# Patient Record
Sex: MALE | Race: WHITE | Employment: OTHER | URBAN - METROPOLITAN AREA
[De-identification: names, ages, dates, MRNs, and addresses within clinical notes are randomized per-mention and may not be internally consistent; named-entity substitution may affect disease eponyms.]

---

## 2017-01-16 ENCOUNTER — ALLIED HEALTH/NURSE VISIT (OUTPATIENT)
Dept: FAMILY MEDICINE | Facility: CLINIC | Age: 52
End: 2017-01-16

## 2017-01-16 ENCOUNTER — TELEPHONE (OUTPATIENT)
Dept: FAMILY MEDICINE | Facility: CLINIC | Age: 52
End: 2017-01-16

## 2017-01-16 VITALS — DIASTOLIC BLOOD PRESSURE: 96 MMHG | RESPIRATION RATE: 18 BRPM | SYSTOLIC BLOOD PRESSURE: 147 MMHG | HEART RATE: 57 BPM

## 2017-01-16 DIAGNOSIS — Z01.30 BP CHECK: Primary | ICD-10-CM

## 2017-01-16 PROCEDURE — 99207 ZZC NO CHARGE NURSE ONLY: CPT

## 2017-01-16 NOTE — TELEPHONE ENCOUNTER
S-(situation): Patient in clinic today for bp check.  Taking chlorthalidone 25mg daily and metoprolol 100mg BID for bp control.  Denies s/s of high or low bp.  Patient states he has been watching what he eats.  He has lost some weight - uncertain of how much as he does not have a scale but notices his clothes fit better.  He is also very active with his daily activities.  He states his 2 bps today are much lower than what they have been.      B-(background): Blood pressures in clinic:  1st reading = 48/99, HR = 60  2nd reading = 147/96, HR = 57    BP Readings from Last 6 Encounters:   01/16/17 147/96   11/18/16 158/101   11/01/16 150/87   10/19/16 162/102   09/15/16 155/99   07/28/16 152/100     A-(assessment): Bp still above goal.    R-(recommendations): Please advise.    Lisa MITCHELL RN

## 2017-01-16 NOTE — TELEPHONE ENCOUNTER
Covering for PCP- Omar will likely need to start a third medication for his blood pressure.  Recommend that he make an appointment with Dr. Epstein to follow-up on this and discuss options.    Cecil Oliver MD

## 2017-02-08 DIAGNOSIS — I10 ESSENTIAL HYPERTENSION WITH GOAL BLOOD PRESSURE LESS THAN 140/90: Primary | ICD-10-CM

## 2017-02-08 NOTE — TELEPHONE ENCOUNTER
Chllorthalidone      Last Written Prescription Date: 12/27/2016  Last Fill Quantity: 30, # refills: 0  Last Office Visit with [facility] or [facility] prescribing provider: 11/01/2016       POTASSIUM   Date Value Ref Range Status   04/20/2016 3.6 3.4 - 5.3 mmol/L Final     CREATININE   Date Value Ref Range Status   04/20/2016 0.84 0.66 - 1.25 mg/dL Final     BP Readings from Last 3 Encounters:   01/16/17 147/96   11/18/16 158/101   11/01/16 150/87       Horacio HUTSON (R)

## 2017-02-14 RX ORDER — CHLORTHALIDONE 25 MG/1
25 TABLET ORAL DAILY
Qty: 30 TABLET | Refills: 0 | Status: SHIPPED | OUTPATIENT
Start: 2017-02-14 | End: 2017-07-10

## 2017-02-28 DIAGNOSIS — I10 ESSENTIAL HYPERTENSION WITH GOAL BLOOD PRESSURE LESS THAN 140/90: ICD-10-CM

## 2017-02-28 NOTE — TELEPHONE ENCOUNTER
Metoprolol      Last Written Prescription Date: 12/27/2016  Last Fill Quantity: 60, # refills: 0  Last Office Visit with [facility] or [facility] prescribing provider: 11/01/2016       Potassium   Date Value Ref Range Status   04/20/2016 3.6 3.4 - 5.3 mmol/L Final     Creatinine   Date Value Ref Range Status   04/20/2016 0.84 0.66 - 1.25 mg/dL Final     BP Readings from Last 3 Encounters:   01/16/17 (!) 147/96   11/18/16 (!) 158/101   11/01/16 150/87       Horacio HUTSON (R)

## 2017-03-01 RX ORDER — METOPROLOL TARTRATE 100 MG
100 TABLET ORAL 2 TIMES DAILY
Qty: 60 TABLET | Refills: 0 | Status: SHIPPED | OUTPATIENT
Start: 2017-03-01 | End: 2017-03-07

## 2017-03-01 NOTE — TELEPHONE ENCOUNTER
Per last BP check patient is to follow up with PCP to discuss adding another medication.  Patient notified of this and plans to follow up.  Refilled x1 month.    Marcie Torres RN

## 2017-03-07 ENCOUNTER — TELEPHONE (OUTPATIENT)
Dept: FAMILY MEDICINE | Facility: CLINIC | Age: 52
End: 2017-03-07

## 2017-03-07 ENCOUNTER — ALLIED HEALTH/NURSE VISIT (OUTPATIENT)
Dept: FAMILY MEDICINE | Facility: CLINIC | Age: 52
End: 2017-03-07

## 2017-03-07 VITALS — HEART RATE: 59 BPM | SYSTOLIC BLOOD PRESSURE: 134 MMHG | DIASTOLIC BLOOD PRESSURE: 85 MMHG

## 2017-03-07 DIAGNOSIS — I10 ESSENTIAL HYPERTENSION WITH GOAL BLOOD PRESSURE LESS THAN 140/90: Primary | ICD-10-CM

## 2017-03-07 DIAGNOSIS — I10 ESSENTIAL HYPERTENSION WITH GOAL BLOOD PRESSURE LESS THAN 140/90: ICD-10-CM

## 2017-03-07 PROCEDURE — 99207 ZZC NO CHARGE NURSE ONLY: CPT

## 2017-03-07 RX ORDER — METOPROLOL TARTRATE 100 MG
100 TABLET ORAL 2 TIMES DAILY
Qty: 180 TABLET | Refills: 0 | Status: SHIPPED | OUTPATIENT
Start: 2017-03-07 | End: 2017-07-01

## 2017-03-07 NOTE — TELEPHONE ENCOUNTER
Keep metoprolol as is.  Hold Chlorthalidone for now since BP is within goal.  Recheck Bp via RN visit in 2 months.

## 2017-03-07 NOTE — TELEPHONE ENCOUNTER
"Patient here for Bp recheck today, \"I have been exercising for 34 days in a row, including walking mostly. \"  \"once a week I may lift weights. \"    \"I am not taking the chlorthalidone. It was out when I went there a while ago and I just never went back to get it and I don't want to take it, \"  \"I am not opposed to taking it, but I don't think I need it, now. \" \"will you ask Dr if I can just skip that one, keep doing the exercise and stay on the metoprolol? \"     \"I am at max dose of the metoprolol, and that is where I am willing to stay until I can get my own BP down to the 120's or something, will you ask Dr Epstein if that is ok? \"    Creatinine   Date Value Ref Range Status   04/20/2016 0.84 0.66 - 1.25 mg/dL Final   ]  Potassium   Date Value Ref Range Status   04/20/2016 3.6 3.4 - 5.3 mmol/L Final   ]      Dr Epstein, BP today:   Vital Signs 3/7/2017   Systolic 134   Diastolic 85   Pulse 59   After exercising religiously for 34 days in a row, and taking metoprolol 100 mg bid     Ok? And refills? And when to see him again?     Salena Garrison RNC    "

## 2017-05-25 ENCOUNTER — APPOINTMENT (OUTPATIENT)
Dept: GENERAL RADIOLOGY | Facility: CLINIC | Age: 52
End: 2017-05-25
Attending: EMERGENCY MEDICINE
Payer: COMMERCIAL

## 2017-05-25 ENCOUNTER — HOSPITAL ENCOUNTER (EMERGENCY)
Facility: CLINIC | Age: 52
Discharge: HOME | End: 2017-05-25
Attending: EMERGENCY MEDICINE | Admitting: EMERGENCY MEDICINE
Payer: COMMERCIAL

## 2017-05-25 VITALS
HEART RATE: 76 BPM | OXYGEN SATURATION: 95 % | TEMPERATURE: 98.9 F | DIASTOLIC BLOOD PRESSURE: 119 MMHG | RESPIRATION RATE: 20 BRPM | SYSTOLIC BLOOD PRESSURE: 184 MMHG

## 2017-05-25 DIAGNOSIS — M54.50 ACUTE MIDLINE LOW BACK PAIN WITHOUT SCIATICA: ICD-10-CM

## 2017-05-25 DIAGNOSIS — S60.512A HAND ABRASION, LEFT, INITIAL ENCOUNTER: ICD-10-CM
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
## 2017-05-25 PROCEDURE — 99283 EMERGENCY DEPT VISIT LOW MDM: CPT

## 2017-05-25 PROCEDURE — 72100 X-RAY EXAM L-S SPINE 2/3 VWS: CPT

## 2017-05-25 PROCEDURE — 99284 EMERGENCY DEPT VISIT MOD MDM: CPT | Performed by: EMERGENCY MEDICINE

## 2017-05-25 ASSESSMENT — ENCOUNTER SYMPTOMS
BACK PAIN: 1
SHORTNESS OF BREATH: 0
CONFUSION: 0
COUGH: 0
NECK PAIN: 0
HEADACHES: 0
DIZZINESS: 0
VOMITING: 0
NUMBNESS: 0
LIGHT-HEADEDNESS: 0
NAUSEA: 0
ABDOMINAL PAIN: 0
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
## 2017-05-25 NOTE — ED AVS SNAPSHOT
[facility] Emergency Department    [address] [address]    Phone:  [phone]    Fax:  [phone]                                       Omar Taylor   MRN: [id number]    Department:  [facility] Emergency Department   Date of Visit:  5/25/2017           After Visit Summary Signature Page     I have received my discharge instructions, and my questions have been answered. I have discussed any challenges I see with this plan with the nurse or doctor.    ..........................................................................................................................................  Patient/Patient Representative Signature      ..........................................................................................................................................  Patient Representative Print Name and Relationship to Patient    ..................................................               ................................................  Date                                            Time    ..........................................................................................................................................  Reviewed by Signature/Title    ...................................................              ..............................................  Date                                                            Time
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
## 2017-06-27 ENCOUNTER — TELEPHONE (OUTPATIENT)
Dept: FAMILY MEDICINE | Facility: CLINIC | Age: 52
End: 2017-06-27

## 2017-06-27 DIAGNOSIS — I10 ESSENTIAL HYPERTENSION WITH GOAL BLOOD PRESSURE LESS THAN 140/90: ICD-10-CM

## 2017-06-27 RX ORDER — METOPROLOL TARTRATE 100 MG
TABLET ORAL
Qty: 180 TABLET | Refills: 0 | Status: CANCELLED | OUTPATIENT
Start: 2017-06-27

## 2017-06-27 NOTE — TELEPHONE ENCOUNTER
Metoprolol      Last Written Prescription Date: 03/07/2017  Last Fill Quantity: 180, # refills: 0    Last Office Visit with [facility] or [facility] prescribing provider:  11/01/2016   Future Office Visit:        BP Readings from Last 3 Encounters:   05/25/17 (!) 184/119   03/07/17 134/85   01/16/17 (!) 147/96

## 2017-07-01 ENCOUNTER — NURSE TRIAGE (OUTPATIENT)
Dept: NURSING | Facility: CLINIC | Age: 52
End: 2017-07-01

## 2017-07-01 DIAGNOSIS — I10 ESSENTIAL HYPERTENSION WITH GOAL BLOOD PRESSURE LESS THAN 140/90: ICD-10-CM

## 2017-07-01 RX ORDER — METOPROLOL TARTRATE 100 MG
100 TABLET ORAL 2 TIMES DAILY
Qty: 60 TABLET | Refills: 0 | OUTPATIENT
Start: 2017-07-01 | End: 2017-07-03
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
## 2017-07-03 ENCOUNTER — TELEPHONE (OUTPATIENT)
Dept: FAMILY MEDICINE | Facility: CLINIC | Age: 52
End: 2017-07-03

## 2017-07-03 DIAGNOSIS — I10 ESSENTIAL HYPERTENSION WITH GOAL BLOOD PRESSURE LESS THAN 140/90: ICD-10-CM

## 2017-07-03 RX ORDER — METOPROLOL TARTRATE 100 MG
100 TABLET ORAL 2 TIMES DAILY
Qty: 60 TABLET | Refills: 0 | Status: SHIPPED | OUTPATIENT
Start: 2017-07-03 | End: 2017-08-07

## 2017-07-03 NOTE — TELEPHONE ENCOUNTER
Resent the prescription to pharmacy again, script confirmed.  Left message on identifiable voicemail that script was resent today.  Kimani

## 2017-07-03 NOTE — TELEPHONE ENCOUNTER
Reason for Call:  Other prescription     Detailed comments: Pt is very upset - He is out of Metoprolol and he called and talked to a triage nurse Saturday and she was supposed to send an Rx to [facility].  Pt is at [facility] NOW and they do not have the Rx.  I see where the Rx was eprescribed 07/01/17, but I do not see that they confirmed receipt? Please send new Rx asap and call pt so that he knows they have rec'd it.        Phone Number Patient can be reached at: Cell number on file:    Telephone Information:   Mobile [phone]       Best Time: any    Can we leave a detailed message on this number? YES    Call taken on 7/3/2017 at 1:36 PM by Katrin Trent

## 2017-07-10 ENCOUNTER — TELEPHONE (OUTPATIENT)
Dept: FAMILY MEDICINE | Facility: CLINIC | Age: 52
End: 2017-07-10

## 2017-07-10 ENCOUNTER — ALLIED HEALTH/NURSE VISIT (OUTPATIENT)
Dept: FAMILY MEDICINE | Facility: CLINIC | Age: 52
End: 2017-07-10

## 2017-07-10 VITALS
WEIGHT: 262.2 LBS | SYSTOLIC BLOOD PRESSURE: 138 MMHG | DIASTOLIC BLOOD PRESSURE: 98 MMHG | HEART RATE: 60 BPM | BODY MASS INDEX: 37.89 KG/M2

## 2017-07-10 DIAGNOSIS — I10 HYPERTENSION GOAL BP (BLOOD PRESSURE) < 140/90: ICD-10-CM

## 2017-07-10 DIAGNOSIS — I10 ESSENTIAL HYPERTENSION WITH GOAL BLOOD PRESSURE LESS THAN 140/90: ICD-10-CM

## 2017-07-10 DIAGNOSIS — I10 ESSENTIAL HYPERTENSION WITH GOAL BLOOD PRESSURE LESS THAN 140/90: Primary | ICD-10-CM

## 2017-07-10 DIAGNOSIS — Z13.6 CARDIOVASCULAR SCREENING; LDL GOAL LESS THAN 130: Primary | ICD-10-CM

## 2017-07-10 PROCEDURE — 99207 ZZC NO CHARGE NURSE ONLY: CPT

## 2017-07-10 NOTE — TELEPHONE ENCOUNTER
Nursing Note   Zaida Mortensen RN (Registered Nurse)      Pt stopped into clinic for RN blood pressure recheck.  He was last seen 11/1/16 by provider.     Today's reading is 158/90, pulse of 56  Recheck 5 min later is 138/98, pulse of 60.     Pt began a weight loss program 12 weeks ago.  He is feeling great and losing weight.  Pt prefers to continue to work on his weight loss goals at this time and is reluctant to add medications; even temporarily.     Routed to provider for further instructions in a telephone note.     Zaida Mortensen RN        BP Readings from Last 6 Encounters:   07/10/17 (!) 138/98   05/25/17 (!) 184/119   03/07/17 134/85   01/16/17 (!) 147/96   11/18/16 (!) 158/101   11/01/16 150/87          Wt Readings from Last 2 Encounters:   07/10/17 262 lb 3.2 oz (118.9 kg)   11/01/16 287 lb 12.8 oz (130.5 kg)            Lab Results   Component Value Date     CR 0.84 04/20/2016            Lab Results   Component Value Date     POTASSIUM 3.6 04/20/2016          Recent Labs   Lab Test  03/26/15   1529   LDL  127         Also, pt is due for fasting lab recheck.  Zaida Mortensen RN

## 2017-07-10 NOTE — NURSING NOTE
BP Readings from Last 6 Encounters:   07/10/17 (!) 138/98   05/25/17 (!) 184/119   03/07/17 134/85   01/16/17 (!) 147/96   11/18/16 (!) 158/101   11/01/16 150/87     Wt Readings from Last 2 Encounters:   07/10/17 262 lb 3.2 oz (118.9 kg)   11/01/16 287 lb 12.8 oz (130.5 kg)     Lab Results   Component Value Date    CR 0.84 04/20/2016     Lab Results   Component Value Date    POTASSIUM 3.6 04/20/2016     Recent Labs   Lab Test  03/26/15   1529   LDL  127         Also, pt is due for lab recheck.

## 2017-07-10 NOTE — LETTER
[facility]  [address] [address]  [phone]        November 6, 2017    Omar Taylor  [address]  [place] [address]              Dear Omar Taylor    This is to remind you that your Basic profile and Fasting Lipids is due.    You may call our office at [phone] to schedule an appointment.    Please disregard this notice if you have already had your labs drawn or made an appointment.        Sincerely,        Thomas Epstein MD

## 2017-07-10 NOTE — NURSING NOTE
Pt stopped into clinic for RN blood pressure recheck.  He was last seen 11/1/16 by provider.    Today's reading is 158/90, pulse of 56  Recheck 5 min later is 138/98, pulse of 60.    Pt began a weight loss program 12 weeks ago.  He is feeling great and losing weight.  Pt prefers to continue to work on his weight loss goals at this time and is reluctant to add medications; even temporarily.    Routed to provider for further instructions in a telephone note.    Zaida Mortensen RN

## 2017-07-10 NOTE — TELEPHONE ENCOUNTER
BP not at goal.  Continue metoprolol 100 mg BID.  Restart Chlorthalidone 25 mg daily.  Recheck BP 1 month.    Continue sodium restriction and weight management.

## 2017-07-10 NOTE — MR AVS SNAPSHOT
"              After Visit Summary   7/10/2017    Omar Taylor    MRN: [id number]           Patient Information     Date Of Birth          1965        Visit Information        Provider Department      7/10/2017 1:00 PM CATHIE DENNIS/BOBBY PONCE [facility]        Today's Diagnoses     Essential hypertension with goal blood pressure less than 140/90    -  1       Follow-ups after your visit        Who to contact     If you have questions or need follow up information about today's clinic visit or your schedule please contact [facility] directly at [phone].  Normal or non-critical lab and imaging results will be communicated to you by [employer], letter or phone within 4 business days after the clinic has received the results. If you do not hear from us within 7 days, please contact the clinic through [employer] or phone. If you have a critical or abnormal lab result, we will notify you by phone as soon as possible.  Submit refill requests through Eldarion or call your pharmacy and they will forward the refill request to us. Please allow 3 business days for your refill to be completed.          Additional Information About Your Visit        MyChart Information     Eldarion lets you send messages to your doctor, view your test results, renew your prescriptions, schedule appointments and more. To sign up, go to [email or web address] . Click on \"Log in\" on the left side of the screen, which will take you to the Welcome page. Then click on \"Sign up Now\" on the right side of the page.     You will be asked to enter the access code listed below, as well as some personal information. Please follow the directions to create your username and password.     Your access code is: [email or web address]  Expires: 10/8/2017  1:15 PM     Your access code will  in 90 days. If you need help or a new code, please call your [facility] or [phone].        Care EveryWhere ID     This is your Care EveryWhere ID. " This could be used by other organizations to access your [facility] medical records  [id number]        Your Vitals Were     Pulse BMI (Body Mass Index)                60 37.89 kg/m2           Blood Pressure from Last 3 Encounters:   07/10/17 (!) 138/98   05/25/17 (!) 184/119   03/07/17 134/85    Weight from Last 3 Encounters:   07/10/17 262 lb 3.2 oz (118.9 kg)   11/01/16 287 lb 12.8 oz (130.5 kg)   07/28/16 285 lb (129.3 kg)              Today, you had the following     No orders found for display       Primary Care Provider Office Phone # Fax #    Thomas Brigido Epstein [phone]       [facility]        [address] [address]        Equal Access to Services     PAUL GIBSON : Hadii aad ku hadasho Somariely, waaxda luqadaha, qaybta kaalmada colette, jareth guthrie . So [facility] [phone].    ATENCIÓN: Si habla español, tiene a caballero disposición servicios gratuitos de asistencia lingüística. Tasneem al [phone].    We comply with applicable federal civil rights laws and [place] laws. We do not discriminate on the basis of race, color, national origin, age, disability sex, sexual orientation or gender identity.            Thank you!     Thank you for choosing [facility]  for your care. Our goal is always to provide you with excellent care. Hearing back from our patients is one way we can continue to improve our services. Please take a few minutes to complete the written survey that you may receive in the mail after your visit with us. Thank you!             Your Updated Medication List - Protect others around you: Learn how to safely use, store and throw away your medicines at www.disposemymeds.org.          This list is accurate as of: 7/10/17  1:15 PM.  Always use your most recent med list.                   Brand Name Dispense Instructions for use Diagnosis    aspirin 81 MG EC tablet     90 tablet    Take 1  tablet (81 mg) by mouth daily    Hypertension goal BP (blood pressure) < 140/90       chlorthalidone 25 MG tablet    HYGROTON    30 tablet    Take 1 tablet (25 mg) by mouth daily    Essential hypertension with goal blood pressure less than 140/90       melatonin 3 MG tablet     90 tablet    Take 1 tablet (3 mg) by mouth At Bedtime    Sleep difficulties       metoprolol 100 MG tablet    LOPRESSOR    60 tablet    Take 1 tablet (100 mg) by mouth 2 times daily .  Have Blood pressure rechecked prior to next refill.    Essential hypertension with goal blood pressure less than 140/90

## 2017-07-11 RX ORDER — CHLORTHALIDONE 25 MG/1
25 TABLET ORAL DAILY
Qty: 30 TABLET | Refills: 0 | Status: SHIPPED | OUTPATIENT
Start: 2017-07-11 | End: 2017-12-05

## 2017-08-07 DIAGNOSIS — I10 ESSENTIAL HYPERTENSION WITH GOAL BLOOD PRESSURE LESS THAN 140/90: ICD-10-CM

## 2017-08-07 RX ORDER — METOPROLOL TARTRATE 100 MG
TABLET ORAL
Qty: 60 TABLET | Refills: 0 | Status: SHIPPED | OUTPATIENT
Start: 2017-08-07 | End: 2017-09-15

## 2017-08-07 NOTE — TELEPHONE ENCOUNTER
Metoprolol      Last Written Prescription Date: 07/3/17  Last Fill Quantity: 60, # refills: 0    Last Office Visit with G, P or [facility] prescribing provider:  11/1/16   Future Office Visit:        BP Readings from Last 3 Encounters:   07/10/17 (!) 138/98   05/25/17 (!) 184/119   03/07/17 134/85

## 2017-09-15 ENCOUNTER — TELEPHONE (OUTPATIENT)
Dept: FAMILY MEDICINE | Facility: CLINIC | Age: 52
End: 2017-09-15

## 2017-09-15 DIAGNOSIS — I10 ESSENTIAL HYPERTENSION WITH GOAL BLOOD PRESSURE LESS THAN 140/90: ICD-10-CM

## 2017-09-15 RX ORDER — METOPROLOL TARTRATE 100 MG
TABLET ORAL
Qty: 60 TABLET | Refills: 0 | Status: CANCELLED | OUTPATIENT
Start: 2017-09-15

## 2017-09-15 RX ORDER — METOPROLOL TARTRATE 100 MG
TABLET ORAL
Qty: 60 TABLET | Refills: 0 | Status: SHIPPED | OUTPATIENT
Start: 2017-09-15 | End: 2017-10-12

## 2017-09-15 NOTE — TELEPHONE ENCOUNTER
metoprolol 100 mg      Last Written Prescription Date: 8/7/17  Last Fill Quantity: 60, # refills: 0    Last Office Visit with G, P or [facility] prescribing provider:  11/01/16   Future Office Visit:        BP Readings from Last 3 Encounters:   07/10/17 (!) 138/98   05/25/17 (!) 184/119   03/07/17 134/85   metoprolol      Patient needs this today please.

## 2017-09-15 NOTE — TELEPHONE ENCOUNTER
metoprolol (LOPRESSOR) 100 MG tablet      Last Written Prescription Date: 8/7/2017  Last Fill Quantity: 60, # refills: 0    Last Office Visit with G, [facility] or [facility] prescribing provider:  11/1/2016   Future Office Visit:        BP Readings from Last 3 Encounters:   07/10/17 (!) 138/98   05/25/17 (!) 184/119   03/07/17 134/85

## 2017-10-12 DIAGNOSIS — I10 ESSENTIAL HYPERTENSION WITH GOAL BLOOD PRESSURE LESS THAN 140/90: ICD-10-CM

## 2017-10-12 RX ORDER — METOPROLOL TARTRATE 100 MG
TABLET ORAL
Qty: 60 TABLET | Refills: 0 | Status: SHIPPED | OUTPATIENT
Start: 2017-10-12 | End: 2017-11-28

## 2017-10-12 NOTE — TELEPHONE ENCOUNTER
metoprolol (LOPRESSOR) 100 MG tablet      Last Written Prescription Date: 9/15/2017  Last Fill Quantity: 60, # refills: 0    Last Office Visit with G, [facility] or [facility] prescribing provider:  11/1/2016   Future Office Visit:        BP Readings from Last 3 Encounters:   07/10/17 (!) 138/98   05/25/17 (!) 184/119   03/07/17 134/85

## 2017-11-01 ENCOUNTER — TELEPHONE (OUTPATIENT)
Dept: FAMILY MEDICINE | Facility: CLINIC | Age: 52
End: 2017-11-01

## 2017-11-01 NOTE — TELEPHONE ENCOUNTER
Panel Management Review    Composite cancer screening  Chart review shows that this patient is due/due soon for the following Colonoscopy  Summary:    Patient is due/failing the following:   BP CHECK and COLONOSCOPY    Action needed:   Patient needs office visit for f/u on hypertension. and Patient needs referral/order: colonoscopy    Type of outreach:    Phone, spoke to patient.  transferred to  to schedule.  Also sent letter due to pt not following through with appt being scheduled.  Questions for provider review:    None                                                                                                                                    Mayela Irby CMA

## 2017-11-01 NOTE — LETTER
November 1, 2017      Omar Taylor  [address]  [place] [address]        Dr. Epstein's Care Team has been reviewing your chart and it appears that there are aspects of your care that could be improved. At this time you are due for a clinic visit for follow up on high blood pressure and a colonoscopy.  If you could plan to do that in the near future it would be very helpful.  We are trying to help our patients achieve their health care goals.    If you have any questions, please feel free to call the clinic at [phone].              Sincerely,        Thomas Epstein MD

## 2017-11-28 ENCOUNTER — TELEPHONE (OUTPATIENT)
Dept: FAMILY MEDICINE | Facility: CLINIC | Age: 52
End: 2017-11-28

## 2017-11-28 DIAGNOSIS — I10 ESSENTIAL HYPERTENSION WITH GOAL BLOOD PRESSURE LESS THAN 140/90: ICD-10-CM

## 2017-11-28 RX ORDER — METOPROLOL TARTRATE 100 MG
TABLET ORAL
Qty: 16 TABLET | Refills: 0 | Status: SHIPPED | OUTPATIENT
Start: 2017-11-28 | End: 2017-12-05

## 2017-11-28 NOTE — TELEPHONE ENCOUNTER
Pt calling asking for enough pills to get him to his appointment on 12-5-17. He states he's afraid of being without it because he heard it can cause him to have a stroke.     metoprolol (LOPRESSOR) 100 MG tablet        Last Written Prescription Date: 10/12/17  Last Fill Quantity: 60, # refills: 0    Last Office Visit with [facility], [facility] or [facility] prescribing provider:  11/01/16   Future Office Visit:    Next 5 appointments (look out 90 days)     Dec 05, 2017  9:00 AM CST   SHORT with Thomas Epstein MD   [facility]    [address] [address]   [phone]                    BP Readings from Last 3 Encounters:   07/10/17 (!) 138/98   05/25/17 (!) 184/119   03/07/17 134/85         Catrina Reed  Clinic Station

## 2017-12-05 ENCOUNTER — OFFICE VISIT (OUTPATIENT)
Dept: FAMILY MEDICINE | Facility: CLINIC | Age: 52
End: 2017-12-05

## 2017-12-05 VITALS
HEIGHT: 70 IN | BODY MASS INDEX: 36.82 KG/M2 | HEART RATE: 67 BPM | WEIGHT: 257.2 LBS | TEMPERATURE: 97.1 F | RESPIRATION RATE: 14 BRPM | DIASTOLIC BLOOD PRESSURE: 94 MMHG | SYSTOLIC BLOOD PRESSURE: 152 MMHG

## 2017-12-05 DIAGNOSIS — E66.01 MORBID OBESITY, UNSPECIFIED OBESITY TYPE (H): ICD-10-CM

## 2017-12-05 DIAGNOSIS — Z53.20 COLONOSCOPY REFUSED: ICD-10-CM

## 2017-12-05 DIAGNOSIS — I10 UNCONTROLLED HYPERTENSION: Primary | ICD-10-CM

## 2017-12-05 DIAGNOSIS — Z28.21 REFUSED INFLUENZA VACCINE: ICD-10-CM

## 2017-12-05 PROCEDURE — 99214 OFFICE O/P EST MOD 30 MIN: CPT | Performed by: FAMILY MEDICINE

## 2017-12-05 RX ORDER — CHLORTHALIDONE 25 MG/1
25 TABLET ORAL DAILY
Qty: 30 TABLET | Refills: 0
Start: 2017-12-05

## 2017-12-05 RX ORDER — METOPROLOL TARTRATE 100 MG
TABLET ORAL
Qty: 90 TABLET | Refills: 0 | Status: SHIPPED | OUTPATIENT
Start: 2017-12-05 | End: 2018-01-29

## 2017-12-05 NOTE — NURSING NOTE
"Chief Complaint   Patient presents with     Hypertension     Pt here for a f/u on b/p.       Initial BP (!) 161/98  Pulse 67  Temp 97.1  F (36.2  C) (Tympanic)  Ht 5' 10\" (1.778 m)  Wt 257 lb 3.2 oz (116.7 kg)  BMI 36.9 kg/m2 Estimated body mass index is 36.9 kg/(m^2) as calculated from the following:    Height as of this encounter: 5' 10\" (1.778 m).    Weight as of this encounter: 257 lb 3.2 oz (116.7 kg).  Medication Reconciliation: complete  Mayela Irby CMA    "

## 2017-12-05 NOTE — PATIENT INSTRUCTIONS
Schedule nurse visit to recheck blood pressure in 1 month.  Schedule lab only appointment at your soonest convenience; be sure to be fasting for 12 hours before blood draw.    Start chlorthalidone as prescribed.  Continue metoprolol as prescribed.  Be consistent with low salt,  low trans fat and saturated fat diet.  Eat food rich in omega-3-fatty acids as you tolerate. (salmon, olive oil)  Eat 5 cups of vegetables, fruits and whole grains per day.  Limit starchy food (white rice, white bread, white pasta, white potatoes) to less than a cup per meal.  Minimize sweets, junk food and fastfood. Limit soda beverages to one serving per day; best to avoid it altogether though.  Exercise: moderate intensity sustained for at least 30 mins per episode, goal of 150 mins per week at least  Combine cardiovascular and resistance exercises.  These exercise recommendations are in addition to your daily activity at work or home.  Continue to work on losing weight.    You have refused colon cancer screening by any method.  You are strongly advised to reconsider this decision to prevent colon cancer or its complications.  You refused to get a flu vaccine today.  This increases your risk for complications if you do contract the virus.  If you change your decision, do get the vaccine as soon as you can.

## 2017-12-05 NOTE — MR AVS SNAPSHOT
After Visit Summary   12/5/2017    Omar Taylor    MRN: [id number]           Patient Information     Date Of Birth          1965        Visit Information        Provider Department      12/5/2017 9:00 AM Thomas Epstein MD [facility]        Today's Diagnoses     Uncontrolled hypertension    -  1    Morbid obesity, unspecified obesity type (H)        Colonoscopy refused        Refused influenza vaccine          Care Instructions    Schedule nurse visit to recheck blood pressure in 1 month.  Schedule lab only appointment at your soonest convenience; be sure to be fasting for 12 hours before blood draw.    Start chlorthalidone as prescribed.  Continue metoprolol as prescribed.  Be consistent with low salt,  low trans fat and saturated fat diet.  Eat food rich in omega-3-fatty acids as you tolerate. (salmon, olive oil)  Eat 5 cups of vegetables, fruits and whole grains per day.  Limit starchy food (white rice, white bread, white pasta, white potatoes) to less than a cup per meal.  Minimize sweets, junk food and fastfood. Limit soda beverages to one serving per day; best to avoid it altogether though.  Exercise: moderate intensity sustained for at least 30 mins per episode, goal of 150 mins per week at least  Combine cardiovascular and resistance exercises.  These exercise recommendations are in addition to your daily activity at work or home.  Continue to work on losing weight.    You have refused colon cancer screening by any method.  You are strongly advised to reconsider this decision to prevent colon cancer or its complications.  You refused to get a flu vaccine today.  This increases your risk for complications if you do contract the virus.  If you change your decision, do get the vaccine as soon as you can.          Follow-ups after your visit        Follow-up notes from your care team     Return if symptoms worsen or fail to improve.      Who to contact     If you  "have questions or need follow up information about today's clinic visit or your schedule please contact [facility] directly at [phone].  Normal or non-critical lab and imaging results will be communicated to you by MyChart, letter or phone within 4 business days after the clinic has received the results. If you do not hear from us within 7 days, please contact the clinic through [employer] or phone. If you have a critical or abnormal lab result, we will notify you by phone as soon as possible.  Submit refill requests through Nano ePrint or call your pharmacy and they will forward the refill request to us. Please allow 3 business days for your refill to be completed.          Additional Information About Your Visit        [employer]ark Information     Nano ePrint lets you send messages to your doctor, view your test results, renew your prescriptions, schedule appointments and more. To sign up, go to [email or web address] ePrint . Click on \"Log in\" on the left side of the screen, which will take you to the Welcome page. Then click on \"Sign up Now\" on the right side of the page.     You will be asked to enter the access code listed below, as well as some personal information. Please follow the directions to create your username and password.     Your access code is: [email or web address]  Expires: 3/5/2018  9:46 AM     Your access code will  in 90 days. If you need help or a new code, please call your [facility] clinic or [phone].        Care EveryWhere ID     This is your Care EveryWhere ID. This could be used by other organizations to access your [facility] medical records  [id number]        Your Vitals Were     Pulse Temperature Respirations Height BMI (Body Mass Index)       67 97.1  F (36.2  C) (Tympanic) 14 5' 10\" (1.778 m) 36.9 kg/m2        Blood Pressure from Last 3 Encounters:   17 (!) 152/94   07/10/17 (!) 138/98   17 (!) 184/119    Weight from Last 3 Encounters:   17 257 lb 3.2 oz (116.7 kg) "   07/10/17 262 lb 3.2 oz (118.9 kg)   11/01/16 287 lb 12.8 oz (130.5 kg)              Today, you had the following     No orders found for display         Today's Medication Changes          These changes are accurate as of: 12/5/17  9:47 AM.  If you have any questions, ask your nurse or doctor.               These medicines have changed or have updated prescriptions.        Dose/Directions    metoprolol 100 MG tablet   Commonly known as:  LOPRESSOR   This may have changed:  additional instructions   Used for:  Uncontrolled hypertension   Changed by:  Thomas Epstein MD        TAKE ONE TABLET BY MOUTH TWICE DAILY   Quantity:  90 tablet   Refills:  0            Where to get your medicines      These medications were sent to [facility] [address] [place] [address] [address] [place] [address]  [address] [facility] [address]     Phone:  [phone]     metoprolol 100 MG tablet         Some of these will need a paper prescription and others can be bought over the counter.  Ask your nurse if you have questions.     You don't need a prescription for these medications     chlorthalidone 25 MG tablet                Primary Care Provider Office Phone # Fax #    Thomas Epstein [phone] [address] [facility] [address]        Equal Access to Services     PAUL GIBSON AH: Abril solo Sojieali, waaxda luqadaha, qaybta kaalmada adeegyada, jareth rivero. So [facility] [phone].    ATENCIÓN: Si habla español, tiene a caballero disposición servicios gratuitos de asistencia lingüística. Llame al [phone].    We comply with applicable federal civil rights laws and [place] laws. We do not discriminate on the basis of race, color, national origin, age, disability, sex, sexual orientation, or gender identity.            Thank you!     Thank you for choosing [facility]  for your care. Our goal is always to provide you with excellent  care. Hearing back from our patients is one way we can continue to improve our services. Please take a few minutes to complete the written survey that you may receive in the mail after your visit with us. Thank you!             Your Updated Medication List - Protect others around you: Learn how to safely use, store and throw away your medicines at www.disposemymeds.org.          This list is accurate as of: 12/5/17  9:47 AM.  Always use your most recent med list.                   Brand Name Dispense Instructions for use Diagnosis    chlorthalidone 25 MG tablet    HYGROTON    30 tablet    Take 1 tablet (25 mg) by mouth daily    Uncontrolled hypertension       metoprolol 100 MG tablet    LOPRESSOR    90 tablet    TAKE ONE TABLET BY MOUTH TWICE DAILY    Uncontrolled hypertension

## 2017-12-05 NOTE — PROGRESS NOTES
"Chief Complaint   Patient presents with     Hypertension     Pt here for a f/u on b/p.     Flu Shot     declined     Health Maintenance     colonoscopy declined         Hypertension Follow-up      Outpatient blood pressures are being checked at store.  Results are 150's/90's.    Low Salt Diet: no added salt        Amount of exercise or physical activity: just started doing 3 days a wk walking on treadmill; no other exercise    Problems taking medications regularly: No    Medication side effects: none    Diet: low salt    Patient also has changed how he eats to decrease weight - breakfast: protein shake; lunch and dinner: \"more sensible choices\".    Medication Followup of metoprolol    Taking Medication as prescribed: yes    Side Effects:  None    Medication Helping Symptoms:  yes     Patient states he never took chlorthalidone last year because he \"did not want it\".    Colon cancer screening: patient just did not want it  Flu shot: patient declined it because he said he did not want it.    Problem list and histories reviewed & adjusted, as indicated.  Additional history: as documented    Patient Active Problem List   Diagnosis     Hypertension goal BP (blood pressure) < 140/90     CARDIOVASCULAR SCREENING; LDL GOAL LESS THAN 130     Obesity     Essential hypertension with goal blood pressure less than 140/90     Colonoscopy refused     Refused influenza vaccine     Past Surgical History:   Procedure Laterality Date     BACK SURGERY  2004    fusion t2-t6     CHOLECYSTECTOMY  2013       Social History   Substance Use Topics     Smoking status: Never Smoker     Smokeless tobacco: Never Used     Alcohol use No     Family History   Problem Relation Age of Onset     DIABETES Mother          Current Outpatient Prescriptions   Medication Sig Dispense Refill     metoprolol (LOPRESSOR) 100 MG tablet TAKE ONE TABLET BY MOUTH TWICE DAILY 90 tablet 0     chlorthalidone (HYGROTON) 25 MG tablet Take 1 tablet (25 mg) by mouth daily " "30 tablet 0     [DISCONTINUED] metoprolol (LOPRESSOR) 100 MG tablet TAKE ONE TABLET BY MOUTH TWICE DAILY -HAVE  BLOOD  PRESSURE  CHECKED  PRIOR  TO  NEXT  REFILL- 16 tablet 0     [DISCONTINUED] chlorthalidone (HYGROTON) 25 MG tablet Take 1 tablet (25 mg) by mouth daily 30 tablet 0     Allergies   Allergen Reactions     Prinzide [Lisinopril-Hydrochlorothiazide] Rash     BP Readings from Last 3 Encounters:   12/05/17 (!) 152/94   07/10/17 (!) 138/98   05/25/17 (!) 184/119    Wt Readings from Last 3 Encounters:   12/05/17 257 lb 3.2 oz (116.7 kg)   07/10/17 262 lb 3.2 oz (118.9 kg)   11/01/16 287 lb 12.8 oz (130.5 kg)            Labs reviewed in EPIC        Reviewed and updated as needed this visit by clinical staffTobacco  Allergies  Med Hx  Surg Hx  Fam Hx  Soc Hx      Reviewed and updated as needed this visit by Provider  Allergies         ROS:  C: NEGATIVE for fever, chills, or change in weight  I: NEGATIVE for worrisome rashes, moles or lesions  E: NEGATIVE for vision changes or irritation  E/M: NEGATIVE for ear, mouth and throat problems  R: NEGATIVE for significant cough or SOB  CV: NEGATIVE for chest pain, palpitations or peripheral edema  GI: NEGATIVE for nausea, abdominal pain, heartburn, or change in bowel habits  : NEGATIVE for frequency, dysuria, or hematuria  M: NEGATIVE for significant arthralgias or myalgia  N: NEGATIVE for weakness, dizziness or paresthesias  E: NEGATIVE for temperature intolerance, skin/hair changes  H: NEGATIVE for bleeding problems    OBJECTIVE:                                                    BP (!) 152/94  Pulse 67  Temp 97.1  F (36.2  C) (Tympanic)  Resp 14  Ht 5' 10\" (1.778 m)  Wt 257 lb 3.2 oz (116.7 kg)  BMI 36.9 kg/m2  Body mass index is 36.9 kg/(m^2).  GENERAL: morbidly obese, alert and no distress, ambulatory w/o assist  NECK: no tenderness, no adenopathy,  Thyroid not enlarged  RESP: lungs clear to auscultation - no rales, no rhonchi, no wheezes  CV: " regular rates and rhythm, no murmur  MS: no edema  SKIN: no suspicious lesions, no rashes  ABD:  nontender    Diagnostic test results:  Diagnostic Test Results:  none        ASSESSMENT/PLAN:                                                      ICD-10-CM    1. Uncontrolled hypertension I10 metoprolol (LOPRESSOR) 100 MG tablet - continue BID     chlorthalidone (HYGROTON) 25 MG tablet - to be started   Patient has been advised in detail on causes, course, complications and treatment of condition.  Low salt, low fat diet.   Exercise recommendations reinforced.  Patient was advised on adherence with meds.  Return precautions given.     2. Morbid obesity, unspecified obesity type (H) E66.01 Improved weight compared to last year.  Patient was advised in detail on diet and exercise recommendations.     3. Colonoscopy refused Z53.20 Patient was advised on value of screening, and early detection. Patient declined both colonoscopy and FIT testing.  Patient was encouraged to reconsider his decision.     4. Refused influenza vaccine Z28.21 Offered vaccine.  Discussed benefits of getting it and risk of not getting it. Patient declined.       Follow up with RN 1 month   Patient Instructions   Schedule nurse visit to recheck blood pressure in 1 month.  Schedule lab only appointment at your soonest convenience; be sure to be fasting for 12 hours before blood draw.    Start chlorthalidone as prescribed.  Continue metoprolol as prescribed.  Be consistent with low salt,  low trans fat and saturated fat diet.  Eat food rich in omega-3-fatty acids as you tolerate. (salmon, olive oil)  Eat 5 cups of vegetables, fruits and whole grains per day.  Limit starchy food (white rice, white bread, white pasta, white potatoes) to less than a cup per meal.  Minimize sweets, junk food and fastfood. Limit soda beverages to one serving per day; best to avoid it altogether though.  Exercise: moderate intensity sustained for at least 30 mins per episode,  goal of 150 mins per week at least  Combine cardiovascular and resistance exercises.  These exercise recommendations are in addition to your daily activity at work or home.  Continue to work on losing weight.    You have refused colon cancer screening by any method.  You are strongly advised to reconsider this decision to prevent colon cancer or its complications.  You refused to get a flu vaccine today.  This increases your risk for complications if you do contract the virus.  If you change your decision, do get the vaccine as soon as you can.      Thomas Epstein MD  [facility]

## 2017-12-14 DIAGNOSIS — I10 HYPERTENSION GOAL BP (BLOOD PRESSURE) < 140/90: ICD-10-CM

## 2017-12-14 DIAGNOSIS — Z13.6 CARDIOVASCULAR SCREENING; LDL GOAL LESS THAN 130: ICD-10-CM

## 2017-12-14 LAB
ANION GAP SERPL CALCULATED.3IONS-SCNC: 5 MMOL/L (ref 3–14)
BUN SERPL-MCNC: 23 MG/DL (ref 7–30)
CALCIUM SERPL-MCNC: 8.6 MG/DL (ref 8.5–10.1)
CHLORIDE SERPL-SCNC: 101 MMOL/L (ref 94–109)
CHOLEST SERPL-MCNC: 151 MG/DL
CO2 SERPL-SCNC: 32 MMOL/L (ref 20–32)
CREAT SERPL-MCNC: 0.83 MG/DL (ref 0.66–1.25)
GFR SERPL CREATININE-BSD FRML MDRD: >90 ML/MIN/1.7M2
GLUCOSE SERPL-MCNC: 104 MG/DL (ref 70–99)
HDLC SERPL-MCNC: 36 MG/DL
LDLC SERPL CALC-MCNC: 77 MG/DL
NONHDLC SERPL-MCNC: 115 MG/DL
POTASSIUM SERPL-SCNC: 3.7 MMOL/L (ref 3.4–5.3)
SODIUM SERPL-SCNC: 138 MMOL/L (ref 133–144)
TRIGL SERPL-MCNC: 188 MG/DL

## 2017-12-14 PROCEDURE — 36415 COLL VENOUS BLD VENIPUNCTURE: CPT | Performed by: FAMILY MEDICINE

## 2017-12-14 PROCEDURE — 80061 LIPID PANEL: CPT | Performed by: FAMILY MEDICINE

## 2017-12-14 PROCEDURE — 80048 BASIC METABOLIC PNL TOTAL CA: CPT | Performed by: FAMILY MEDICINE

## 2017-12-22 ENCOUNTER — TELEPHONE (OUTPATIENT)
Dept: FAMILY MEDICINE | Facility: CLINIC | Age: 52
End: 2017-12-22

## 2017-12-22 NOTE — TELEPHONE ENCOUNTER
Patient is calling for his lab results. I gave him the results.  Eloisa Monae  Cl[facility] [place] [facility]

## 2018-01-29 ENCOUNTER — TELEPHONE (OUTPATIENT)
Dept: FAMILY MEDICINE | Facility: CLINIC | Age: 53
End: 2018-01-29

## 2018-01-29 DIAGNOSIS — I10 UNCONTROLLED HYPERTENSION: ICD-10-CM

## 2018-01-30 NOTE — TELEPHONE ENCOUNTER
"Requested Prescriptions   Pending Prescriptions Disp Refills     metoprolol tartrate (LOPRESSOR) 100 MG tablet [Pharmacy Med Name: METOPROLOL TART 100MG TAB]  Last Written Prescription Date:  12/05/2017  Last Fill Quantity: 90,  # refills: 0   Last Office Visit with [facility] provider:  12/05/2017   Future Office Visit:      90 tablet 0     Sig: TAKE ONE TABLET BY MOUTH TWICE DAILY    Beta-Blockers Protocol Failed    1/29/2018 11:23 AM       Failed - Blood pressure under 140/90    BP Readings from Last 3 Encounters:   12/05/17 (!) 152/94   07/10/17 (!) 138/98   05/25/17 (!) 184/119                Passed - Patient is age 6 or older       Passed - Recent or future visit with authorizing provider's specialty    Patient had office visit in the last year or has a visit in the next 30 days with authorizing provider.  See \"Patient Info\" tab in inbasket, or \"Choose Columns\" in Meds & Orders section of the refill encounter.             Horacio HUTSON (R)    "

## 2018-02-01 RX ORDER — METOPROLOL TARTRATE 100 MG
TABLET ORAL
Qty: 90 TABLET | Refills: 0 | Status: SHIPPED | OUTPATIENT
Start: 2018-02-01 | End: 2018-03-09

## 2018-02-01 NOTE — TELEPHONE ENCOUNTER
Pt calling stating he is leaving for out of town in two hours and took his last pill this morning. Can we refill this for him.    Catrina [place]  [facility] [place]

## 2018-03-09 ENCOUNTER — ALLIED HEALTH/NURSE VISIT (OUTPATIENT)
Dept: FAMILY MEDICINE | Facility: CLINIC | Age: 53
End: 2018-03-09

## 2018-03-09 ENCOUNTER — TELEPHONE (OUTPATIENT)
Dept: FAMILY MEDICINE | Facility: CLINIC | Age: 53
End: 2018-03-09

## 2018-03-09 VITALS — DIASTOLIC BLOOD PRESSURE: 107 MMHG | OXYGEN SATURATION: 96 % | HEART RATE: 57 BPM | SYSTOLIC BLOOD PRESSURE: 161 MMHG

## 2018-03-09 DIAGNOSIS — Z01.30 BP CHECK: Primary | ICD-10-CM

## 2018-03-09 DIAGNOSIS — I10 UNCONTROLLED HYPERTENSION: ICD-10-CM

## 2018-03-09 PROCEDURE — 99207 ZZC NO CHARGE NURSE ONLY: CPT

## 2018-03-09 RX ORDER — METOPROLOL TARTRATE 100 MG
100 TABLET ORAL 2 TIMES DAILY
Qty: 60 TABLET | Refills: 0 | Status: SHIPPED | OUTPATIENT
Start: 2018-03-09

## 2018-03-09 NOTE — TELEPHONE ENCOUNTER
Pt in clinic for a BP check today.  States he takes metoprolol 100 mg BID daily consistently.  Pt reports inconsistent dosing with chlorthalidone 25 mg tablet, takes M-F and is less on consistent on the weekends.  Pt denies symptoms of hypertension including HA, blurry vision, numbness/tingling and chest pain. Pt has continued exercise and managing diet.    Vitals:    03/09/18 1043 03/09/18 1110   BP: (!) 170/110 (!) 161/107   BP Location: Left arm Right arm   Patient Position: Sitting Sitting   Cuff Size: Adult Large Adult Large   Pulse: 58 57   SpO2: 95% 96%     Pt has concerns about continuing to take metoprolol as he feels that this medication is not managing his BP well.  Pt would like to avoid adding multiple medications at this time and is asking if there is an alternative medication available to try while he continues with improved lifestyle changes and weight reduction?  Pt to purchase a BP cuff and begin monitoring BP at home.  Discussed S/S of hypertension and ED precautions.      Zaida RAMOS RN

## 2018-03-09 NOTE — MR AVS SNAPSHOT
"              After Visit Summary   3/9/2018    Omar Taylor    MRN: [id number]           Patient Information     Date Of Birth          1965        Visit Information        Provider Department      3/9/2018 10:30 AM CATHIE DENNIS/BOBBY PONCE [facility]        Today's Diagnoses     BP check    -  1       Follow-ups after your visit        Who to contact     If you have questions or need follow up information about today's clinic visit or your schedule please contact [facility] directly at [phone].  Normal or non-critical lab and imaging results will be communicated to you by MyChart, letter or phone within 4 business days after the clinic has received the results. If you do not hear from us within 7 days, please contact the clinic through [employer] or phone. If you have a critical or abnormal lab result, we will notify you by phone as soon as possible.  Submit refill requests through [employer] or call your pharmacy and they will forward the refill request to us. Please allow 3 business days for your refill to be completed.          Additional Information About Your Visit        MyChart Information     [employer] lets you send messages to your doctor, view your test results, renew your prescriptions, schedule appointments and more. To sign up, go to [email or web address] . Click on \"Log in\" on the left side of the screen, which will take you to the Welcome page. Then click on \"Sign up Now\" on the right side of the page.     You will be asked to enter the access code listed below, as well as some personal information. Please follow the directions to create your username and password.     Your access code is: [email or web address]  Expires: 2018 12:28 PM     Your access code will  in 90 days. If you need help or a new code, please call your [facility] or [phone].        Care EveryWhere ID     This is your Care EveryWhere ID. This could be used by other organizations to access your " [place] medical records  [id number]        Your Vitals Were     Pulse Pulse Oximetry                57 96%           Blood Pressure from Last 3 Encounters:   03/09/18 (!) 161/107   12/05/17 (!) 152/94   07/10/17 (!) 138/98    Weight from Last 3 Encounters:   12/05/17 257 lb 3.2 oz (116.7 kg)   07/10/17 262 lb 3.2 oz (118.9 kg)   11/01/16 287 lb 12.8 oz (130.5 kg)              Today, you had the following     No orders found for display       Primary Care Provider Office Phone # Fax #    Thomas Brigido Epstein [phone] [address] [facility] [address]        Equal Access to Services     PAUL GIBSON : Abril solo Somariely, waaxda luqadaha, qaybta kaalmada adeegyada, jareth guthrie . So [facility] [place] [phone].    ATENCIÓN: Si habla español, tiene a caballero disposición servicios gratuitos de asistencia lingüística. Llame al [phone].    We comply with applicable federal civil rights laws and [place] laws. We do not discriminate on the basis of race, color, national origin, age, disability, sex, sexual orientation, or gender identity.            Thank you!     Thank you for choosing [facility]  for your care. Our goal is always to provide you with excellent care. Hearing back from our patients is one way we can continue to improve our services. Please take a few minutes to complete the written survey that you may receive in the mail after your visit with us. Thank you!             Your Updated Medication List - Protect others around you: Learn how to safely use, store and throw away your medicines at www.disposemymeds.org.          This list is accurate as of 3/9/18 12:29 PM.  Always use your most recent med list.                   Brand Name Dispense Instructions for use Diagnosis    chlorthalidone 25 MG tablet    HYGROTON    30 tablet    Take 1 tablet (25 mg) by mouth daily    Uncontrolled hypertension       metoprolol  tartrate 100 MG tablet    LOPRESSOR    90 tablet    TAKE ONE TABLET BY MOUTH TWICE DAILY    Uncontrolled hypertension

## 2018-03-09 NOTE — TELEPHONE ENCOUNTER
He will need to schedule a clinic visit to discuss next steps for medications, either starting or switching.    Covering for provider  Castillo Roman MD  [facility]

## 2018-03-09 NOTE — TELEPHONE ENCOUNTER
Patient notified.  He will schedule an appt.  He states he is going to run out of meds - RN sent 30 days to pharmacy so he does not run out.  Lisa MITCHELL RN

## 2018-03-16 DIAGNOSIS — I10 ESSENTIAL HYPERTENSION WITH GOAL BLOOD PRESSURE LESS THAN 140/90: ICD-10-CM

## 2018-03-16 RX ORDER — CHLORTHALIDONE 25 MG/1
TABLET ORAL
Qty: 30 TABLET | Refills: 0 | Status: SHIPPED | OUTPATIENT
Start: 2018-03-16

## 2018-03-16 NOTE — TELEPHONE ENCOUNTER
"Requested Prescriptions   Pending Prescriptions Disp Refills     chlorthalidone (HYGROTON) 25 MG tablet [Pharmacy Med Name: CHLORTHALIDONE 25MG    TAB]  Last Written Prescription Date:  12/05/2017  Last Fill Quantity: 30,  # refills: 0   Last office visit: 12/05/2017 with prescribing provider:  Tete   Future Office Visit:     30 tablet 0     Sig: TAKE ONE TABLET BY MOUTH ONCE DAILY    Diuretics (Including Combos) Protocol Failed    3/16/2018 11:42 AM       Failed - Blood pressure under 140/90 in past 12 months    BP Readings from Last 3 Encounters:   03/09/18 (!) 161/107   12/05/17 (!) 152/94   07/10/17 (!) 138/98                Passed - Recent (12 mo) or future (30 days) visit within the authorizing provider's specialty    Patient had office visit in the last 12 months or has a visit in the next 30 days with authorizing provider or within the authorizing provider's specialty.  See \"Patient Info\" tab in inbasket, or \"Choose Columns\" in Meds & Orders section of the refill encounter.           Passed - Patient is age 18 or older       Passed - Normal serum creatinine on file in past 12 months    Recent Labs   Lab Test  12/14/17   0925   CR  0.83             Passed - Normal serum potassium on file in past 12 months    Recent Labs   Lab Test  12/14/17   0925   POTASSIUM  3.7                   Passed - Normal serum sodium on file in past 12 months    Recent Labs   Lab Test  12/14/17   0925   NA  138              Horacio HUTSON (R)    "

## 2018-03-27 ENCOUNTER — TELEPHONE (OUTPATIENT)
Dept: FAMILY MEDICINE | Facility: CLINIC | Age: 53
End: 2018-03-27

## 2018-03-27 NOTE — TELEPHONE ENCOUNTER
Panel Management Review      Composite cancer screening  Chart review shows that this patient is due/due soon for the following Colonoscopy  Summary:    Patient is due/failing the following:   BP CHECK and COLONOSCOPY    Action needed:   Patient needs office visit for f/u on hypertension. and Patient needs referral/order: colonoscopy    Type of outreach:    Sent letter.    Questions for provider review:    None                                                                                                                                    Mayela Irby CMA

## 2018-03-27 NOTE — LETTER
March 27, 2018      Omar Taylor  [address]  [facility] [address]          Dr. Epstein's Care Team has been reviewing your chart and it appears that there are aspects of your care that could be improved. At this time you are due for a follow up visit with Dr Epstein for high blood pressure and also a colonoscopy.  If you could plan to do that in the near future it would be very helpful.  We are trying to help our patients achieve their health care goals.    If you have any questions, please feel free to call the clinic at [phone].          Sincerely,        Thomas Epstein MD

## 2018-09-13 ENCOUNTER — TELEPHONE (OUTPATIENT)
Dept: FAMILY MEDICINE | Facility: CLINIC | Age: 53
End: 2018-09-13

## 2018-09-13 NOTE — LETTER
September 13, 2018      Omar Taylor  [address]  [facility] [facility] [address]            Dr. Epstein's Care Team has been reviewing your chart and it appears that there are aspects of your care that could be improved. At this time you are due for a colonoscopy and an office visit to follow up on your blood pressure .  If you could plan to do that in the near future it would be very helpful.  We are trying to help our patients achieve their health care goals.    If you have any questions, please feel free to call the clinic at [phone].              Sincerely,        Thomas Epstein MD

## 2018-09-13 NOTE — TELEPHONE ENCOUNTER
Panel Management Review      Composite cancer screening  Chart review shows that this patient is due/due soon for the following Colonoscopy  Summary:    Patient is due/failing the following:   BP CHECK and COLONOSCOPY    Action needed:   Patient needs office visit for f/u on b/p. and Patient needs referral/order: colonoscopy    Type of outreach:    Sent letter.    Questions for provider review:    None                                                                                                                                    Mayela Irby CMA